# Patient Record
Sex: MALE | Race: WHITE | URBAN - NONMETROPOLITAN AREA
[De-identification: names, ages, dates, MRNs, and addresses within clinical notes are randomized per-mention and may not be internally consistent; named-entity substitution may affect disease eponyms.]

---

## 2024-11-20 ENCOUNTER — OFFICE VISIT (OUTPATIENT)
Dept: URBAN - NONMETROPOLITAN AREA CLINIC 4 | Facility: CLINIC | Age: 74
End: 2024-11-20

## 2024-11-20 ENCOUNTER — DASHBOARD ENCOUNTERS (OUTPATIENT)
Age: 74
End: 2024-11-20

## 2024-11-20 VITALS
WEIGHT: 168 LBS | SYSTOLIC BLOOD PRESSURE: 149 MMHG | TEMPERATURE: 98 F | HEIGHT: 71 IN | DIASTOLIC BLOOD PRESSURE: 70 MMHG | BODY MASS INDEX: 23.52 KG/M2 | HEART RATE: 93 BPM

## 2024-11-20 PROBLEM — 724556004: Status: ACTIVE | Noted: 2024-11-20

## 2024-11-20 NOTE — HPI-TODAY'S VISIT:
11/20/24: Pt is a 75 yo male with PMH of CAD, CKD stage III, HTN, bilateral knee replacements, L1 fracture 6/2024, and alcohol and tobacco abuse who was referred by Lorena Rodriguez NP, for ANDREAS.  A copy of this document will be sent to the referring physician.   Pt recently admitted to [facility] 9/27-10/8. He initially presented to ED with melena.  Hgb on presentation was 4.6.  PRBCs were ordered and he was admitted to the ICU.  Despite PRBC transfusion, his melanotic stools increased, he became hypotensive and he had further drop in his Hgb.  He was intubated and placed on vasopressors.  He was then transferred to [facility] ICU. GI and vascular were consulted.  He underwent EGD on the morning of 9/28 that showed significant clot in the fundus of the stomach with no active bleed, and a large fibrin clot encompassing the entire bulb of the duodenum.  GI recommended to keep him intubated and for staged relook in 24 hours.  Vascular was consulted and a CTA was done.  On 9/29, he was taken to the [facility] where he had a coil embolization of the gastroduodenal artery. GI did not feel there was benefit to relook at the area endoscopically for diagnostic purposes. On 9/30, he was weaned off vasopressors and extubated later in the day.  On 10/1, he remained hemodynamically stable and transferred out of the ICU. He was discharged home on Protonix.  Since discharge, he denies any further melena. Per labs 10/18/24, Hgb 9.7, Hct 31.2, MCV 91.8, MCH 28.5, Iron 54, TIBC 202, Iron sat 27%, ferritin 258. Pt thinks his last cscope was at least 8 years ago that was reportedly normal. Paternal grandfather had colon cancer.

## 2025-02-04 ENCOUNTER — OFFICE VISIT (OUTPATIENT)
Dept: URBAN - NONMETROPOLITAN AREA CLINIC 4 | Facility: CLINIC | Age: 75
End: 2025-02-04
Payer: MEDICARE

## 2025-02-04 ENCOUNTER — TELEPHONE ENCOUNTER (OUTPATIENT)
Dept: URBAN - METROPOLITAN AREA CLINIC 54 | Facility: CLINIC | Age: 75
End: 2025-02-04

## 2025-02-04 ENCOUNTER — OFFICE VISIT (OUTPATIENT)
Dept: URBAN - NONMETROPOLITAN AREA CLINIC 4 | Facility: CLINIC | Age: 75
End: 2025-02-04

## 2025-02-04 ENCOUNTER — LAB OUTSIDE AN ENCOUNTER (OUTPATIENT)
Dept: URBAN - NONMETROPOLITAN AREA CLINIC 4 | Facility: CLINIC | Age: 75
End: 2025-02-04

## 2025-02-04 VITALS — HEIGHT: 71 IN | BODY MASS INDEX: 24.39 KG/M2 | WEIGHT: 174.2 LBS | TEMPERATURE: 98.6 F

## 2025-02-04 DIAGNOSIS — R19.5 POSITIVE COLORECTAL CANCER SCREENING USING COLOGUARD TEST: ICD-10-CM

## 2025-02-04 PROCEDURE — 99213 OFFICE O/P EST LOW 20 MIN: CPT | Performed by: REGISTERED NURSE

## 2025-02-04 NOTE — HPI-TODAY'S VISIT:
11/20/24: Pt is a 75 yo male with PMH of CAD, CKD stage III, HTN, bilateral knee replacements, L1 fracture 6/2024, and alcohol and tobacco abuse who was referred by Lorena Rodriguez NP, for ANDREAS.  A copy of this document will be sent to the referring physician.   Pt recently admitted to [facility] 9/27-10/8. He initially presented to ED with melena.  Hgb on presentation was 4.6.  PRBCs were ordered and he was admitted to the ICU.  Despite PRBC transfusion, his melanotic stools increased, he became hypotensive and he had further drop in his Hgb.  He was intubated and placed on vasopressors.  He was then transferred to [facility] ICU. GI and vascular were consulted.  He underwent EGD on the morning of 9/28 that showed significant clot in the fundus of the stomach with no active bleed, and a large fibrin clot encompassing the entire bulb of the duodenum.  GI recommended to keep him intubated and for staged relook in 24 hours.  Vascular was consulted and a CTA was done.  On 9/29, he was taken to the [facility] where he had a coil embolization of the gastroduodenal artery. GI did not feel there was benefit to relook at the area endoscopically for diagnostic purposes. On 9/30, he was weaned off vasopressors and extubated later in the day.  On 10/1, he remained hemodynamically stable and transferred out of the ICU. He was discharged home on Protonix.  Since discharge, he denies any further melena. Per labs 10/18/24, Hgb 9.7, Hct 31.2, MCV 91.8, MCH 28.5, Iron 54, TIBC 202, Iron sat 27%, ferritin 258. Pt thinks his last cscope was at least 8 years ago that was reportedly normal. Paternal grandfather had colon cancer.  2/4/25: Pt RTC for positive cologuard on 12/10/24. Denies overt GI bleeding. No change in bowel habits, appetite and weight.